# Patient Record
Sex: FEMALE | Race: WHITE | NOT HISPANIC OR LATINO | URBAN - METROPOLITAN AREA
[De-identification: names, ages, dates, MRNs, and addresses within clinical notes are randomized per-mention and may not be internally consistent; named-entity substitution may affect disease eponyms.]

---

## 2024-08-26 ENCOUNTER — OFFICE VISIT (OUTPATIENT)
Dept: URBAN - METROPOLITAN AREA CLINIC 78 | Facility: CLINIC | Age: 29
End: 2024-08-26
Payer: COMMERCIAL

## 2024-08-26 ENCOUNTER — DASHBOARD ENCOUNTERS (OUTPATIENT)
Age: 29
End: 2024-08-26

## 2024-08-26 VITALS
TEMPERATURE: 97.7 F | WEIGHT: 120 LBS | BODY MASS INDEX: 19.29 KG/M2 | SYSTOLIC BLOOD PRESSURE: 114 MMHG | HEART RATE: 81 BPM | HEIGHT: 66 IN | DIASTOLIC BLOOD PRESSURE: 77 MMHG

## 2024-08-26 DIAGNOSIS — R10.11 RUQ ABDOMINAL PAIN: ICD-10-CM

## 2024-08-26 DIAGNOSIS — R94.8 ABNORMAL BILIARY HIDA SCAN: ICD-10-CM

## 2024-08-26 DIAGNOSIS — R68.81 EARLY SATIETY: ICD-10-CM

## 2024-08-26 DIAGNOSIS — R11.0 NAUSEA: ICD-10-CM

## 2024-08-26 PROBLEM — 442717005: Status: ACTIVE | Noted: 2024-08-26

## 2024-08-26 PROBLEM — 301717006: Status: ACTIVE | Noted: 2024-08-26

## 2024-08-26 PROBLEM — 442076002: Status: ACTIVE | Noted: 2024-08-26

## 2024-08-26 PROBLEM — 422587007: Status: ACTIVE | Noted: 2024-08-26

## 2024-08-26 PROCEDURE — 99204 OFFICE O/P NEW MOD 45 MIN: CPT

## 2024-08-26 RX ORDER — DICYCLOMINE HYDROCHLORIDE 20 MG/1
1 TABLET TABLET ORAL THREE TIMES A DAY
Qty: 90 | OUTPATIENT
Start: 2024-08-26 | End: 2024-09-25

## 2024-08-26 RX ORDER — ONDANSETRON 8 MG/1
1 TABLET ON THE TONGUE AND ALLOW TO DISSOLVE  AS NEEDED TABLET, ORALLY DISINTEGRATING ORAL ONCE A DAY
Qty: 30 | OUTPATIENT
Start: 2024-08-26

## 2024-08-26 NOTE — HPI-TODAY'S VISIT:
Patient presents today for follow-up after ER visit on 8/8/2024.  She was at [facility] for evaluation of right lower quadrant abdominal pain that had been persistent for the past day.  It was not associate with nausea, vomiting, diarrhea.  She did report associated chills and bodyaches.    Blood work on 8/8/2024 showed decreased WBC at 4, WNL RBC at 4.29, hemoglobin 12.6, hematocrit 38.1, MCV 88.8, platelets at 208.   CMP showed abnormal CO2 at 21, but otherwise WNL with sodium at 139, potassium of 4.5, chloride at 106, glucose 83, BUN 8.7, creatinine at 0.7, calcium 9.2, total protein is 7.7, total bilirubin 0.3, alkaline phosphatase 60, AST 20, ALT at 19, lipase of 13.  Beta-hCG was negative.  Urine analysis was unremarkable.   CT A/P with contrast on 8/8/2024 showed trace volume of pelvic free fluid, normal appendix, possible small calculus in the dependent portion of the gallbladder, suggested to be further evaluated with ultrasound,.  Nonobstructing left renal calculus measuring 3 mm, findings of pelvic venous insufficiency.  Patient was given pain medication with symptom improvement.  She was then discharged home with advised to follow-up with primary care provider and vascular surgeon.  Patient then presented to [facility] on 8/16/2020 for for evaluation of  8-day history of right-sided flank and upper quadrant pain.  At this evaluation it was reported that she was seen by vascular surgery who examined her and told her to follow-up general surgery stating that her symptoms were related to gallstones and not pelvic congestion syndrome.  At this appointment she reported fevers but no chills, and 9-day history of pain that radiates to her right shoulder and back.  CBC at this time showed normalized WBC at 7.7, WNL RBC at 4.8, hemoglobin of 14.3, hematocrit of 42.5, MCV 88.5, platelets at 283.  CMP showed sodium at 137, potassium 4.1, glucose 86, BUN 13, creatinine 0.8, calcium 9.8, total protein 8.0, total bilirubin 0.2, alkaline phosphatase 66, AST 20, ALT 14, lipase 28.  Patient underwent right upper quadrant ultrasound of abdomen on 8/16/2024 which showed normal right upper quadrant abdominal ultrasound.  Gallbladder was normal with no gallstones or pericholecystic fluid.  No gallbladder wall thickening.  Pancreas is normal.  Intra and extrahepatic bile ducts are normal with CBD measuring at 4 mm.  Liver was normal.  Patient underwent a HIDA scan on 8/22/2024 in which results showed unremarkable HIDA scan with normal gallbladder ejection fraction.  If patient's symptoms are replicated by examination protocol, consider hyperkinetic gallbladder as possible contributing etiology.  The calculated gallbladder ejection fraction is 86%, which is within normal limits of above 35%.  Patient presents today for evaluation of right upper quadrant abdominal pain that radiates towards her right back and shoulder.  She reports that it can sometimes be a sharp squeezing pain and is a 7 or 8/10 however it exacerbates to a 10 out of 10 with eating.  She reports that the pain is worse for about 20 minutes however outside of that is a constant pressure.  She reports associated nausea, with early satiety and low appetite due to exacerbation of pain.  She reports that she is only able to eat one third of a meal because of her symptoms.  She denies vomiting, GERD, dysphagia.  She denies chest pain or shortness of breath.  She denies recent evaluation with a cardiologist or pulmonologist.  She denies use of blood thinners or NSAIDs.  She denies prior EGD or colonoscopy.  She does report a bowel movement every day denies hematochezia or melena.  She does report weight loss due to symptoms but denies unintentional or abnormal weight changes.

## 2024-08-29 ENCOUNTER — WEB ENCOUNTER (OUTPATIENT)
Dept: URBAN - METROPOLITAN AREA CLINIC 78 | Facility: CLINIC | Age: 29
End: 2024-08-29

## 2024-09-16 ENCOUNTER — CLAIMS CREATED FROM THE CLAIM WINDOW (OUTPATIENT)
Dept: URBAN - METROPOLITAN AREA SURGERY CENTER 15 | Facility: SURGERY CENTER | Age: 29
End: 2024-09-16
Payer: COMMERCIAL

## 2024-09-16 ENCOUNTER — OFFICE VISIT (OUTPATIENT)
Dept: URBAN - METROPOLITAN AREA CLINIC 78 | Facility: CLINIC | Age: 29
End: 2024-09-16

## 2024-09-16 DIAGNOSIS — K21.00 GASTRO-ESOPHAGEAL REFLUX DISEASE WITH ESOPHAGITIS, WITHOUT BLEEDING: ICD-10-CM

## 2024-09-16 DIAGNOSIS — R10.13 ABDOMINAL DISCOMFORT, EPIGASTRIC: ICD-10-CM

## 2024-09-16 DIAGNOSIS — K64.9 HEMORRHOIDS: ICD-10-CM

## 2024-09-16 DIAGNOSIS — K63.89 OTHER SPECIFIED DISEASES OF INTESTINE: ICD-10-CM

## 2024-09-16 DIAGNOSIS — K29.60 ADENOPAPILLOMATOSIS GASTRICA: ICD-10-CM

## 2024-09-16 DIAGNOSIS — K21.00 ESOPHAGITIS, REFLUX: ICD-10-CM

## 2024-09-16 DIAGNOSIS — R10.11 ABDOMINAL BURNING SENSATION IN RIGHT UPPER QUADRANT: ICD-10-CM

## 2024-09-16 DIAGNOSIS — K29.70 GASTRITIS, UNSPECIFIED, WITHOUT BLEEDING: ICD-10-CM

## 2024-09-16 PROCEDURE — 43239 EGD BIOPSY SINGLE/MULTIPLE: CPT | Performed by: INTERNAL MEDICINE

## 2024-09-16 PROCEDURE — 45380 COLONOSCOPY AND BIOPSY: CPT | Performed by: INTERNAL MEDICINE

## 2024-09-16 PROCEDURE — 00813 ANES UPR LWR GI NDSC PX: CPT | Performed by: NURSE ANESTHETIST, CERTIFIED REGISTERED

## 2024-09-16 RX ORDER — ONDANSETRON 8 MG/1
1 TABLET ON THE TONGUE AND ALLOW TO DISSOLVE  AS NEEDED TABLET, ORALLY DISINTEGRATING ORAL ONCE A DAY
Qty: 30 | Status: ACTIVE | COMMUNITY
Start: 2024-08-26

## 2024-09-16 RX ORDER — DICYCLOMINE HYDROCHLORIDE 20 MG/1
1 TABLET TABLET ORAL THREE TIMES A DAY
Qty: 90 | Status: ACTIVE | COMMUNITY
Start: 2024-08-26 | End: 2024-09-25

## 2024-09-23 ENCOUNTER — ERX REFILL RESPONSE (OUTPATIENT)
Dept: URBAN - METROPOLITAN AREA CLINIC 78 | Facility: CLINIC | Age: 29
End: 2024-09-23

## 2024-09-23 RX ORDER — DICYCLOMINE HYDROCHLORIDE 20 MG/1
TAKE 1 TABLET BY MOUTH THREE TIMES A DAY FOR 30 DAYS TABLET ORAL
Qty: 270 TABLET | Refills: 1 | OUTPATIENT

## 2024-09-23 RX ORDER — DICYCLOMINE HYDROCHLORIDE 20 MG/1
1 TABLET TABLET ORAL THREE TIMES A DAY
Qty: 90 | OUTPATIENT

## 2024-09-30 ENCOUNTER — OFFICE VISIT (OUTPATIENT)
Dept: URBAN - METROPOLITAN AREA CLINIC 78 | Facility: CLINIC | Age: 29
End: 2024-09-30

## 2024-09-30 VITALS
HEART RATE: 66 BPM | RESPIRATION RATE: 16 BRPM | WEIGHT: 120.6 LBS | BODY MASS INDEX: 19.38 KG/M2 | TEMPERATURE: 98.1 F | SYSTOLIC BLOOD PRESSURE: 113 MMHG | HEIGHT: 66 IN | DIASTOLIC BLOOD PRESSURE: 78 MMHG

## 2024-09-30 RX ORDER — OMEPRAZOLE 40 MG/1
1 CAPSULE 30 MINUTES BEFORE MORNING MEAL CAPSULE, DELAYED RELEASE ORAL ONCE A DAY
Qty: 90 | Refills: 3 | OUTPATIENT
Start: 2024-09-30

## 2024-09-30 RX ORDER — ONDANSETRON 8 MG/1
1 TABLET ON THE TONGUE AND ALLOW TO DISSOLVE  AS NEEDED TABLET, ORALLY DISINTEGRATING ORAL ONCE A DAY
Qty: 30 | Status: ACTIVE | COMMUNITY
Start: 2024-08-26

## 2024-09-30 RX ORDER — DICYCLOMINE HYDROCHLORIDE 20 MG/1
TAKE 1 TABLET BY MOUTH THREE TIMES A DAY FOR 30 DAYS TABLET ORAL
Qty: 270 TABLET | Refills: 1 | Status: ACTIVE | COMMUNITY

## 2024-09-30 NOTE — HPI-TODAY'S VISIT:
28 y.o female, est patient, presents for f/u after EGD and colonoscopy. Procedure report and patholgy were reviewed w patient, all questions answered to patients satsifaction.  She is still having the RUQ pain, now only occuring when she eats or drink. Bentyl does not help. She says the pain can occur an hour after eating, or sometimes while drinking water she can feel it. There is no pattern to this pain. It usually lasts for about 1 hour or so She says the pain is a 7/10 now.  The pain can occur every day; it occurswith almost every meal.   She continues to experience nausea when she first starts eating, but this goes away as she continues to eat.  She denies vomit, but has dry-heaved a couple times. She denies GERD, has not had any symptoms since this pain has started. She denies dysphagia She has a bm QD no blood or mucus. She denies abn weight changes.  She has been referred by her PCP to Dr. Colten Acevedo for an appt on Oct 15th

## 2024-11-11 ENCOUNTER — OFFICE VISIT (OUTPATIENT)
Dept: URBAN - METROPOLITAN AREA CLINIC 78 | Facility: CLINIC | Age: 29
End: 2024-11-11